# Patient Record
Sex: FEMALE | Race: WHITE | NOT HISPANIC OR LATINO | URBAN - METROPOLITAN AREA
[De-identification: names, ages, dates, MRNs, and addresses within clinical notes are randomized per-mention and may not be internally consistent; named-entity substitution may affect disease eponyms.]

---

## 2017-01-16 ENCOUNTER — COMMUNICATION - RIVER FALLS (OUTPATIENT)
Dept: FAMILY MEDICINE | Facility: CLINIC | Age: 27
End: 2017-01-16

## 2017-01-16 ENCOUNTER — OFFICE VISIT - RIVER FALLS (OUTPATIENT)
Dept: FAMILY MEDICINE | Facility: CLINIC | Age: 27
End: 2017-01-16

## 2017-01-16 ASSESSMENT — MIFFLIN-ST. JEOR: SCORE: 1502.86

## 2022-02-12 VITALS
HEART RATE: 72 BPM | DIASTOLIC BLOOD PRESSURE: 72 MMHG | HEIGHT: 68 IN | WEIGHT: 163 LBS | BODY MASS INDEX: 24.71 KG/M2 | RESPIRATION RATE: 16 BRPM | OXYGEN SATURATION: 99 % | TEMPERATURE: 97.8 F | SYSTOLIC BLOOD PRESSURE: 122 MMHG

## 2022-02-16 NOTE — PROGRESS NOTES
Patient:   PHI WEAVER            MRN: [id number]            FIN: [id number]               Age:   26 years     Sex:  Female     :  1990   Associated Diagnoses:   Pharyngitis   Author:   Ryan Baum PA-C      Chief Complaint   2017 2:10 PM CST    New pt here for sore throat x 4-5 months.      History of Present Illness   Chief complaint and symptoms noted above and confirmed with patient   for past 5 months has had an intermittent sore throat, no pattern to pain  no nasal congestion, no heartburn  eating and foods do not seem to affect the sore throat  she is on doxycycline for 2 months for acne, taking this has not affected her throat (not made it better or worse)    she did move into a new place and the sore throat started after that move      Review of Systems   Constitutional:  Negative.    Ear/Nose/Mouth/Throat:  Sore throat, No nasal congestion.    Respiratory:  No cough.    Gastrointestinal:  Negative.       Health Status   Allergies:    Allergic Reactions (Selected)  No Known Medication Allergies   Problem list:    No problem items selected or recorded.   Medications:  (Selected)   Documented Medications  Documented  doxycycline monohydrate 100 mg oral capsule: 1 cap(s) ( 100 mg ), po, bid, 0 Refill(s), Type: Maintenance      Histories   Past Medical History:    No active or resolved past medical history items have been selected or recorded.   Family History:    HTN (Hypertension)  Father     Procedure history:    Extraction of wisdom tooth ([id number]).  Cyst - pilonidal ([id number]).   Social History:        Alcohol Assessment            Never      Tobacco Assessment            Never smoker      Substance Abuse Assessment            Never      Employment and Education Assessment            Employed, Work/School description: Garden Instructor,Outdoor Educator and Violin Instructor.      Sexual Assessment            Sexually active: No.        Physical Examination   Vital Signs   2017 2:10  PM CST Temperature Tympanic 97.8 DegF  LOW    Peripheral Pulse Rate 72 bpm    Pulse Site Radial artery    Respiratory Rate 16 br/min    Systolic Blood Pressure 122 mmHg    Diastolic Blood Pressure 72 mmHg    Mean Arterial Pressure 89 mmHg    BP Site Right arm    Oxygen Saturation 99 %      Measurements from flowsheet : Measurements   1/16/2017 2:10 PM CST Height Measured - Standard 68 in    Weight Measured - Standard 163 lb    BSA 1.88 m2    Body Mass Index 24.78 kg/m2      General:  No acute distress.    HENT:  Tympanic membranes are clear, No sinus tenderness, ooropharynx mildly enlarged and inflamed without exudate, nares are patent.    Neck:  Supple, Non-tender, No lymphadenopathy.    Respiratory:  Lungs are clear to auscultation.    Gastrointestinal:  Soft, Non-tender, Non-distended, Normal bowel sounds, No organomegaly.       Review / Management   Results review:       Interpretation: rapid strep is negative; culture pending, will call if abnormal results..       Impression and Plan   Diagnosis     Pharyngitis (YEV11-AC J02.9).     Summary:  will treat initially with fluticasone nasal spray and loratidine for a month, if not improving after  a month, will have her see Dr Mejia, ENT.    Orders     Orders   Pharmacy:  fluticasone 50 mcg/inh nasal spray (Prescribe): 2 spray(s), Nasal, Daily, # 1 EA, 2 Refill(s), Type: Maintenance, Pharmacy: [facility], 2 spray(s) nasal daily.     Orders   Charges (Evaluation and Management):  [id number] office outpatient new 20 minutes (Charge) (Order): Quantity: 1, Pharyngitis.